# Patient Record
(demographics unavailable — no encounter records)

---

## 2025-05-01 NOTE — PHYSICAL EXAM
[FreeTextEntry1] : General: Patient is awake and alert and in no acute distress. Well developed, well nourished, cooperative.   Head: No obvious deformities.  Skin: The skin is intact, warm, pink, and dry over the area examined.    Eyes: normal conjunctiva, normal eyelids and pupils were equal and round.   ENT: normal ears, normal nose and normal lips.  Cardiovascular: There is brisk capillary refill in the digits of the affected extremity. They are symmetric pulses in the bilateral upper and lower extremities, positive peripheral pulses, brisk capillary refill, but no peripheral edema.  Respiratory: The patient is in no apparent respiratory distress. They're taking full deep breaths without use of accessory muscles or evidence of audible wheezes or stridor without the use of a stethoscope, normal respiratory effort.   Neurological: 5/5 motor strength in the main muscle groups of bilateral lower extremities, sensory intact in bilateral lower extremities.   Musculoskeletal: Neurological examination reveals a grade 5/5 muscle power. Deep tendon reflexes are 1+ with ankle jerk and knee jerk. The plantars are bilaterally down going.There is no asymmetry of calves, no significant leg length discrepancy or significant cafe-au-lait spots.   Examination of both the upper and lower extremities: No obvious abnormalities. 5/5 muscle strength bilaterally.  There is no gross deformity.  Patient has full range of motion of both the hips, knees, ankles, wrists, elbows, and shoulders.  Neck range of motion is full and free without any pain or spasm. Normal appearing fingers and toes. No large birthmarks noted. DTR's are intact.  Gait: Normal gait pattern for his age.  Examination of back: No deformities. Well aligned and symmetrical.

## 2025-05-01 NOTE — REVIEW OF SYSTEMS
[Appropriate Age Development] : development appropriate for age [NI] : Endocrine [Nl] : Hematologic/Lymphatic [Change in Activity] : no change in activity [Fever Above 102] : no fever [Malaise] : no malaise [Rash] : no rash [Itching] : no itching [Nasal Stuffiness] : no nasal congestion [Sore Throat] : no sore throat [Heart Problems] : no heart problems [Murmur] : no murmur [High Blood Pressure] : no high blood pressure [Wheezing] : no wheezing [Tachypnea] : no tachypnea [Cough] : no cough [Shortness of Breath] : no shortness of breath [Vomiting] : no vomiting [Diarrhea] : no diarrhea [Constipation] : no constipation [Limping] : no limping [Joint Pains] : no arthralgias [Joint Swelling] : no joint swelling [Back Pain] : ~T no back pain

## 2025-05-01 NOTE — ASSESSMENT
[FreeTextEntry1] : Physiologic Genu Valgum  Patient is seen in the room with his father who acted as the primary historian for this encounter as the patient is not of appropriate age to act as an independent historian. No xrays taken today.  Clinically, the patient walks with a mild genu valgum gait which is normal for his age group. Has normal development and has been meeting all of his milestones. No deformities noted in his scalp or back and has not been complaining of any pain. Able to do all activities normally. Diagnosis was discussed at length with the father. Reassurance was given as it was discussed that the genu valgum is normal for his age group. Father will continue to observe for any progression of the genu valgum or if the patient starts to complain of any pain. No follow up needed at this time.  I, Edwar Duong PA-C, have acted as scribe for Dr. Barrett for this encounter.   The above documentation completed by the PA is an accurate record of both my words and actions. Cordell Barrett MD.  This note was generated using Dragon medical dictation software.  A reasonable effort has been made for proofreading its contents, but typos may still remain.  If there are any questions or points of clarification needed please do not hesitate to contact my office.

## 2025-05-01 NOTE — CONSULT LETTER
[Dear  ___] : Dear  [unfilled], [Consult Letter:] : I had the pleasure of evaluating your patient, [unfilled]. [Please see my note below.] : Please see my note below. [Consult Closing:] : Thank you very much for allowing me to participate in the care of this patient.  If you have any questions, please do not hesitate to contact me. [Sincerely,] : Sincerely, [FreeTextEntry3] : Cordell Barrett MD [facility] [address] [place] [address] Phone: [phone] Fax: [phone]

## 2025-05-01 NOTE — CONSULT LETTER
[Dear  ___] : Dear  [unfilled], [Consult Letter:] : I had the pleasure of evaluating your patient, [unfilled]. [Please see my note below.] : Please see my note below. [Consult Closing:] : Thank you very much for allowing me to participate in the care of this patient.  If you have any questions, please do not hesitate to contact me. [Sincerely,] : Sincerely, [FreeTextEntry3] : Cordell Barrett MD [facility] [address] [address] Phone: [phone] Fax: [phone]

## 2025-05-01 NOTE — REASON FOR VISIT
[Initial Evaluation] : an initial evaluation [Father] : father [FreeTextEntry1] : knock knees (genu valgum)